# Patient Record
Sex: MALE | Race: BLACK OR AFRICAN AMERICAN | NOT HISPANIC OR LATINO | Employment: FULL TIME | URBAN - METROPOLITAN AREA
[De-identification: names, ages, dates, MRNs, and addresses within clinical notes are randomized per-mention and may not be internally consistent; named-entity substitution may affect disease eponyms.]

---

## 2024-04-18 ENCOUNTER — OFFICE VISIT (OUTPATIENT)
Dept: URGENT CARE | Facility: CLINIC | Age: 39
End: 2024-04-18
Payer: COMMERCIAL

## 2024-04-18 VITALS
BODY MASS INDEX: 24.78 KG/M2 | WEIGHT: 187 LBS | OXYGEN SATURATION: 98 % | SYSTOLIC BLOOD PRESSURE: 128 MMHG | RESPIRATION RATE: 20 BRPM | HEART RATE: 79 BPM | DIASTOLIC BLOOD PRESSURE: 73 MMHG | HEIGHT: 73 IN | TEMPERATURE: 98 F

## 2024-04-18 DIAGNOSIS — J06.9 UPPER RESPIRATORY TRACT INFECTION, UNSPECIFIED TYPE: Primary | ICD-10-CM

## 2024-04-18 LAB
CTP QC/QA: YES
MOLECULAR STREP A: NEGATIVE

## 2024-04-18 PROCEDURE — 87651 STREP A DNA AMP PROBE: CPT | Mod: PBBFAC

## 2024-04-18 PROCEDURE — 99203 OFFICE O/P NEW LOW 30 MIN: CPT | Mod: S$PBB,,,

## 2024-04-18 PROCEDURE — 99214 OFFICE O/P EST MOD 30 MIN: CPT | Mod: PBBFAC

## 2024-04-18 NOTE — PROGRESS NOTES
"Subjective:       Patient ID: David Schulte is a 38 y.o. male.    Vitals:  height is 6' 1" (1.854 m) and weight is 84.8 kg (187 lb). His oral temperature is 98.3 °F (36.8 °C). His blood pressure is 128/73 and his pulse is 79. His respiration is 20 and oxygen saturation is 98%.     Chief Complaint: URI (Cough, chest congestion and sore throat. Patient exposed to Strep.)    38-year-old  male with no prior medical history complains of symptoms times 2-3 days.  States he was exposed to strep by mother.  Reports symptoms worsens in the morning hours, states he has taken Tylenol with some relief.  Reports needs clearance to return to work.     URI   Associated symptoms include coughing and a sore throat. Pertinent negatives include no congestion, diarrhea, ear pain, headaches, nausea or vomiting.       Constitution: Negative for chills and fever.   HENT:  Positive for sore throat. Negative for ear pain and congestion.    Respiratory:  Positive for cough and sputum production.    Gastrointestinal:  Negative for nausea, vomiting and diarrhea.   Neurological:  Negative for headaches.       Objective:      Physical Exam   Constitutional: He is oriented to person, place, and time. He is cooperative. He is easily aroused. He does not appear ill. awake  HENT:   Head: Normocephalic and atraumatic.   Ears:   Right Ear: Tympanic membrane normal.   Left Ear: Tympanic membrane normal.   Nose: Nose normal.   Mouth/Throat: Uvula is midline, oropharynx is clear and moist and mucous membranes are normal. Tonsils are 1+ on the right. Tonsils are 1+ on the left. No tonsillar exudate.   Eyes: Conjunctivae and lids are normal.   Neck: Neck supple.   Cardiovascular: Normal rate, regular rhythm, S1 normal, S2 normal and normal heart sounds.   Pulmonary/Chest: Effort normal and breath sounds normal.   Abdominal: Normal appearance and bowel sounds are normal. Soft. flat abdomen There is no abdominal tenderness. "   Lymphadenopathy:     He has no cervical adenopathy.   Neurological: He is alert, oriented to person, place, and time and easily aroused. Gait normal. GCS eye subscore is 4. GCS verbal subscore is 5. GCS motor subscore is 6.   Skin: Skin is warm, dry and intact. Capillary refill takes less than 2 seconds.   Psychiatric: His behavior is normal.   Nursing note and vitals reviewed.        Assessment:       1. Upper respiratory tract infection, unspecified type          Plan:     Strep is negative to day in clinic , will treat at URI. Encouraged patient to take Tylenol/Motrin as needed. May benefit from salt water rinses. Return to clinic if symptoms does not improve in one week.     Upper respiratory tract infection, unspecified type  -     POCT Strep A, Molecular           Results for orders placed or performed in visit on 04/18/24   POCT Strep A, Molecular   Result Value Ref Range    Molecular Strep A, POC Negative Negative     Acceptable Yes

## 2024-04-18 NOTE — LETTER
April 18, 2024      [facility]  Counts include 234 beds at the [facility] [address]  Phone: [phone]       Patient: David Schulte   YOB: 1985  Date of Visit: 04/18/2024    To Whom It May Concern:    Bia Schulte  was at [facility] on 04/18/2024. The patient may return to work/school on 4/19/2024 with no restrictions. If you have any questions or concerns, or if I can be of further assistance, please do not hesitate to contact me.    Sincerely,    ELIAZAR Kebede

## 2024-04-27 ENCOUNTER — HOSPITAL ENCOUNTER (EMERGENCY)
Facility: HOSPITAL | Age: 39
Discharge: HOME | End: 2024-04-28
Attending: EMERGENCY MEDICINE
Payer: COMMERCIAL

## 2024-04-27 DIAGNOSIS — K08.89 TOOTHACHE: Primary | ICD-10-CM

## 2024-04-27 PROCEDURE — 99284 EMERGENCY DEPT VISIT MOD MDM: CPT

## 2024-04-28 VITALS
TEMPERATURE: 99 F | HEART RATE: 76 BPM | DIASTOLIC BLOOD PRESSURE: 89 MMHG | SYSTOLIC BLOOD PRESSURE: 152 MMHG | RESPIRATION RATE: 18 BRPM | OXYGEN SATURATION: 97 % | HEIGHT: 74 IN | BODY MASS INDEX: 23.74 KG/M2 | WEIGHT: 185 LBS

## 2024-04-28 PROCEDURE — 25000003 PHARM REV CODE 250: Performed by: EMERGENCY MEDICINE

## 2024-04-28 RX ORDER — AMOXICILLIN 500 MG/1
500 TABLET, FILM COATED ORAL EVERY 12 HOURS
Qty: 14 TABLET | Refills: 0 | OUTPATIENT
Start: 2024-04-28 | End: 2024-04-28

## 2024-04-28 RX ORDER — HYDROCODONE BITARTRATE AND ACETAMINOPHEN 5; 325 MG/1; MG/1
1 TABLET ORAL EVERY 6 HOURS PRN
Qty: 6 TABLET | Refills: 0 | Status: SHIPPED | OUTPATIENT
Start: 2024-04-28 | End: 2024-04-30

## 2024-04-28 RX ORDER — AMOXICILLIN 500 MG/1
500 TABLET, FILM COATED ORAL EVERY 12 HOURS
Qty: 14 TABLET | Refills: 0 | Status: SHIPPED | OUTPATIENT
Start: 2024-04-28 | End: 2024-05-05

## 2024-04-28 RX ORDER — HYDROCODONE BITARTRATE AND ACETAMINOPHEN 5; 325 MG/1; MG/1
1 TABLET ORAL
Status: COMPLETED | OUTPATIENT
Start: 2024-04-28 | End: 2024-04-28

## 2024-04-28 RX ORDER — HYDROCODONE BITARTRATE AND ACETAMINOPHEN 5; 325 MG/1; MG/1
1 TABLET ORAL EVERY 6 HOURS PRN
Qty: 6 TABLET | Refills: 0 | Status: SHIPPED | OUTPATIENT
Start: 2024-04-28 | End: 2024-04-28

## 2024-04-28 RX ADMIN — HYDROCODONE BITARTRATE AND ACETAMINOPHEN 1 TABLET: 5; 325 TABLET ORAL at 01:04

## 2024-04-28 NOTE — ED PROVIDER NOTES
"Encounter Date: 4/27/2024       History     Chief Complaint   Patient presents with    Dental Pain    Neck Pain     Pt. States filling fell out of bottom right tooth. C/o tooth pain " giving me trouble for a few months," but states the pain has since radiated down his right neck and made it hard for him to turn his head. No swelling noted. Pt. Denies fever pta. Pt. C/o right ear pain that began today.     Patient states that a proximally 2 months ago he had a filling in his right lower tooth that fell out it hurt initially and then got better and then started hurting again.  Patient states he took ibuprofen to the point where it made his stomach upset no fevers chills some pain in his neck some pain when he turns his head.  No nausea no vomiting no difficulty eating or drinking    The history is provided by the patient.     Review of patient's allergies indicates:  No Known Allergies  No past medical history on file.  No past surgical history on file.  No family history on file.  Social History     Tobacco Use    Smoking status: Every Day     Types: Cigarettes    Smokeless tobacco: Never   Substance Use Topics    Alcohol use: Yes    Drug use: Never     Review of Systems   Constitutional:  Negative for diaphoresis, fatigue and fever.   HENT:  Positive for sore throat.        Physical Exam     Initial Vitals [04/27/24 2305]   BP Pulse Resp Temp SpO2   (!) 152/89 76 16 98.8 °F (37.1 °C) 97 %      MAP       --         Physical Exam    Nursing note and vitals reviewed.  Constitutional: He appears well-developed and well-nourished.   HENT:   Head: Normocephalic and atraumatic.   Right Ear: External ear normal.   Left Ear: External ear normal.   Multiple fillings on the right lower premolar there is a small avulsion to the tooth there is no fluctuance there is no apical tenderness.  No oropharyngeal abnormality no exposed pulp   Eyes: EOM are normal. Pupils are equal, round, and reactive to light.   Neck: No thyromegaly " present. No tracheal deviation present.   Normal range of motion.  Cardiovascular:  Normal rate, regular rhythm, normal heart sounds and intact distal pulses.           Pulmonary/Chest: Breath sounds normal.   Musculoskeletal:         General: Normal range of motion.      Cervical back: Normal range of motion.     Neurological: He is alert and oriented to person, place, and time. He has normal strength. GCS score is 15. GCS eye subscore is 4. GCS verbal subscore is 5. GCS motor subscore is 6.   Skin: Skin is warm and dry. Capillary refill takes less than 2 seconds.   Psychiatric: He has a normal mood and affect. Judgment normal.         ED Course   Procedures  Labs Reviewed - No data to display       Imaging Results    None          Medications   HYDROcodone-acetaminophen 5-325 mg per tablet 1 tablet (1 tablet Oral Given 4/28/24 0126)     Medical Decision Making  Problems Addressed:  Toothache: acute illness or injury    Risk  Prescription drug management.                                      Clinical Impression:  Final diagnoses:  [K08.89] Toothache (Primary)          ED Disposition Condition    Discharge Stable          ED Prescriptions       Medication Sig Dispense Start Date End Date Auth. Provider    amoxicillin (AMOXIL) 500 MG Tab  (Status: Discontinued) Take 1 tablet (500 mg total) by mouth every 12 (twelve) hours. for 7 days 14 tablet 4/28/2024 4/28/2024 Len Mcadams III, MD    HYDROcodone-acetaminophen (NORCO) 5-325 mg per tablet  (Status: Discontinued) Take 1 tablet by mouth every 6 (six) hours as needed for Pain. 6 tablet 4/28/2024 4/28/2024 Len Mcadams III, MD    amoxicillin (AMOXIL) 500 MG Tab Take 1 tablet (500 mg total) by mouth every 12 (twelve) hours. for 7 days 14 tablet 4/28/2024 5/5/2024 Len Mcadams III, MD    HYDROcodone-acetaminophen (NORCO) 5-325 mg per tablet Take 1 tablet by mouth every 6 (six) hours as needed for Pain. 6 tablet 4/28/2024 4/30/2024 Len Mcadams III, MD           Follow-up Information       Follow up With Specialties Details Why Contact Info    follow up with your Dentist  In 3 days               Len Mcadams III, MD  04/28/24 1735